# Patient Record
Sex: FEMALE | URBAN - METROPOLITAN AREA
[De-identification: names, ages, dates, MRNs, and addresses within clinical notes are randomized per-mention and may not be internally consistent; named-entity substitution may affect disease eponyms.]

---

## 2019-09-19 ENCOUNTER — APPOINTMENT (RX ONLY)
Dept: URBAN - METROPOLITAN AREA CLINIC 113 | Facility: CLINIC | Age: 54
Setting detail: DERMATOLOGY
End: 2019-09-19

## 2019-09-19 DIAGNOSIS — E85.4 ORGAN-LIMITED AMYLOIDOSIS: ICD-10-CM

## 2019-09-19 PROCEDURE — 99202 OFFICE O/P NEW SF 15 MIN: CPT

## 2019-09-19 PROCEDURE — ? PRESCRIPTION

## 2019-09-19 PROCEDURE — ? COUNSELING

## 2019-09-19 PROCEDURE — ? GENTLE SKIN CARE INSTRUCTIONS

## 2019-09-19 PROCEDURE — ? TREATMENT REGIMEN

## 2019-09-19 RX ORDER — MOMETASONE FUROATE 1 MG/G
0.1% OINTMENT TOPICAL BID
Qty: 1 | Refills: 1 | Status: ERX | COMMUNITY
Start: 2019-09-19

## 2019-09-19 RX ADMIN — MOMETASONE FUROATE 0.1%: 1 OINTMENT TOPICAL at 20:37

## 2019-09-19 ASSESSMENT — LOCATION ZONE DERM: LOCATION ZONE: TRUNK

## 2019-09-19 ASSESSMENT — LOCATION SIMPLE DESCRIPTION DERM
LOCATION SIMPLE: RIGHT LOWER BACK
LOCATION SIMPLE: ABDOMEN

## 2019-09-19 ASSESSMENT — LOCATION DETAILED DESCRIPTION DERM
LOCATION DETAILED: PERIUMBILICAL SKIN
LOCATION DETAILED: RIGHT INFERIOR MEDIAL MIDBACK

## 2019-09-19 NOTE — PROCEDURE: TREATMENT REGIMEN
Detail Level: Zone
Plan: Begin using CeraVe Hydrating Cleanser as body wash followed by CeraVe Anti-Itch cream. \\nDiscussed taking warm showers and d/c hot showers. \\nDiscussed possible punch biopsy if not improved at next office visit
Initiate Treatment: Mometasone 0.1% ointment apply to affected areas dark spots twice daily followed by CeraVe Anti-itch cream. Do not apply mometasone longer than 2 weeks in any given month. Avoid face, axillae and groin. \\nWhen patient is not itching, only apply CeraVe Anti-Itch cream twice daily.

## 2024-01-10 NOTE — HPI: SKIN LESION
From: David Coronado Jr.  To: Joni Phillips  Sent: 1/10/2024 3:08 PM CST  Subject: New prescriptions    Sorry I forgot to tell you at my appointment today that I need new prescriptions for all my meds sent to [facility].  Thanks,  Angel Coronado  1942  
Is This A New Presentation, Or A Follow-Up?: Skin Lesion
What Type Of Note Output Would You Prefer (Optional)?: Bullet Format
How Severe Is Your Skin Lesion?: moderate
Has Your Skin Lesion Been Treated?: not been treated
Additional History: Bahamian speaker. Daughter is with patient to translate.